# Patient Record
Sex: FEMALE | Race: WHITE | Employment: FULL TIME | URBAN - METROPOLITAN AREA
[De-identification: names, ages, dates, MRNs, and addresses within clinical notes are randomized per-mention and may not be internally consistent; named-entity substitution may affect disease eponyms.]

---

## 2024-05-25 ENCOUNTER — OFFICE VISIT (OUTPATIENT)
Age: 64
End: 2024-05-25

## 2024-05-25 VITALS
BODY MASS INDEX: 19.55 KG/M2 | HEART RATE: 79 BPM | WEIGHT: 132 LBS | RESPIRATION RATE: 18 BRPM | SYSTOLIC BLOOD PRESSURE: 128 MMHG | TEMPERATURE: 99 F | HEIGHT: 69 IN | DIASTOLIC BLOOD PRESSURE: 83 MMHG

## 2024-05-25 DIAGNOSIS — N30.01 ACUTE CYSTITIS WITH HEMATURIA: Primary | ICD-10-CM

## 2024-05-25 LAB
BILIRUBIN, URINE, POC: NEGATIVE
BLOOD URINE, POC: ABNORMAL
GLUCOSE URINE, POC: NEGATIVE
KETONES, URINE, POC: NEGATIVE
LEUKOCYTE ESTERASE, URINE, POC: ABNORMAL
NITRITE, URINE, POC: ABNORMAL
PH, URINE, POC: 5.5 (ref 4.6–8)
PROTEIN,URINE, POC: NEGATIVE
SPECIFIC GRAVITY, URINE, POC: 1 (ref 1–1.03)
URINALYSIS CLARITY, POC: CLEAR
URINALYSIS COLOR, POC: YELLOW
UROBILINOGEN, POC: NORMAL

## 2024-05-25 RX ORDER — CIPROFLOXACIN 250 MG/1
250 TABLET, FILM COATED ORAL 2 TIMES DAILY
Qty: 10 TABLET | Refills: 0 | Status: SHIPPED | OUTPATIENT
Start: 2024-05-25 | End: 2024-05-30

## 2024-05-25 ASSESSMENT — ENCOUNTER SYMPTOMS
RESPIRATORY NEGATIVE: 1
GASTROINTESTINAL NEGATIVE: 1
ALLERGIC/IMMUNOLOGIC NEGATIVE: 1
EYES NEGATIVE: 1

## 2024-05-25 NOTE — PROGRESS NOTES
2024   Sofy Hernandez (: 1960) is a 64 y.o. female, New patient, here for evaluation of the following chief complaint(s):  Urinary Tract Infection (Wednesday symptoms started - urgency and frequency with irritation)     ASSESSMENT/PLAN:  Below is the assessment and plan developed based on review of pertinent history, physical exam, labs, studies, and medications.  1. Acute cystitis with hematuria  -     AMB POC URINALYSIS DIP STICK MANUAL W/O MICRO  -     Urine culture (clean catch); Future  -     ciprofloxacin (CIPRO) 250 MG tablet; Take 1 tablet by mouth 2 times daily for 5 days, Disp-10 tablet, R-0Normal         Handout given with care instructions  2. OTC for symptom management. Increase fluid intake, ensure adequate nutritional intake.  3. Follow up with PCP as needed.  4. Go to ED with development of any acute symptoms.     Follow up:  Return if symptoms worsen or fail to improve.  Follow up immediately for any new, worsening or changes or if symptoms are not improving over the next 5-7 days.     SUBJECTIVE/OBJECTIVE:      SUBJECTIVE: Sofy Hernandez is a 64 y.o. female who complains of urinary frequency, urgency and dysuria x 4 days, without flank pain, fever, chills, or abnormal discharge or bleeding. Patient is postmenopausal. Patient is not sexually active. Patient has a hx iof UTI with most recent being 7 years ago.     Urine dipstick shows positive for blood and positive for leukocytes.          Diagnoses and all orders for this visit:  Urinary tract infection without hematuria, site unspecified  -     AMB POC URINALYSIS DIP STICK MANUAL W/O MICRO      Urinary Tract Infection (Wednesday symptoms started - urgency and frequency with irritation)      No results found for any visits on 24.    No results found for this visit on 24.  XR Results (most recent):  @BSHSILASTIMGCAT(YVD7532:1)@         Review of Systems   Constitutional: Negative.    HENT: Negative.     Eyes: Negative.

## 2024-05-26 LAB
BACTERIA SPEC CULT: ABNORMAL
CC UR VC: ABNORMAL
SERVICE CMNT-IMP: ABNORMAL

## 2024-05-28 LAB
BACTERIA SPEC CULT: ABNORMAL
CC UR VC: ABNORMAL
SERVICE CMNT-IMP: ABNORMAL

## 2024-12-13 ENCOUNTER — OFFICE VISIT (OUTPATIENT)
Age: 64
End: 2024-12-13

## 2024-12-13 VITALS
DIASTOLIC BLOOD PRESSURE: 72 MMHG | WEIGHT: 134 LBS | BODY MASS INDEX: 19.79 KG/M2 | SYSTOLIC BLOOD PRESSURE: 130 MMHG | HEART RATE: 79 BPM | OXYGEN SATURATION: 97 % | TEMPERATURE: 98.5 F

## 2024-12-13 DIAGNOSIS — J01.10 ACUTE NON-RECURRENT FRONTAL SINUSITIS: Primary | ICD-10-CM

## 2024-12-13 NOTE — PROGRESS NOTES
Sofy Hernandez (:  1960) is a 64 y.o. female,Established patient, here for evaluation of the following chief complaint(s):  Sinusitis (Sinus pressure on right side of face, runny nose and ear pressure x 8 days)       ASSESSMENT/PLAN:  1. Acute non-recurrent frontal sinusitis  -     amoxicillin-clavulanate (AUGMENTIN) 875-125 MG per tablet; Take 1 tablet by mouth 2 times daily for 10 days, Disp-20 tablet, R-0Normal      Take antibiotic for acute sinus infection.  Add prebiotic or probiotic while taking this medication.   Call or return to clinic if no improvement or any worsening.  Patient comfortable with plan.         SUBJECTIVE/OBJECTIVE:    Sinusitis      64 y.o. female presents with symptoms of sinus pain and pressure that extends to her right ear, and up to her inner canthus of her right eye. Pain is intermittent and has been ongoing for the past 8 days. Of special note, she has an enlarged lymph node that is hard TTP on the anterior left cervical chain.   Elevated blood pressure, but rechecked, and WNL.        Vitals:    24 1600 24 1648   BP: (!) 159/88 130/72   Site: Left Upper Arm    Position: Sitting    Cuff Size: Medium Adult    Pulse: 79    Temp: 98.5 °F (36.9 °C)    TempSrc: Oral    SpO2: 97%    Weight: 60.8 kg (134 lb)            Physical Exam  Vitals and nursing note reviewed.   HENT:      Head: Normocephalic and atraumatic.      Right Ear: Ear canal and external ear normal. There is no impacted cerumen.      Left Ear: Tympanic membrane, ear canal and external ear normal.      Nose: Rhinorrhea present. No congestion.      Mouth/Throat:      Mouth: Mucous membranes are moist.      Pharynx: Posterior oropharyngeal erythema present.   Cardiovascular:      Rate and Rhythm: Normal rate.      Pulses: Normal pulses.      Heart sounds: No murmur heard.     No friction rub. No gallop.      Comments: Elevated blood pressure  Pulmonary:      Effort: Pulmonary effort is normal.

## 2024-12-13 NOTE — PATIENT INSTRUCTIONS
Please follow up with your PCP in the next two weeks for enlarged lymph node on anterior left side of neck.